# Patient Record
Sex: MALE | Race: WHITE
[De-identification: names, ages, dates, MRNs, and addresses within clinical notes are randomized per-mention and may not be internally consistent; named-entity substitution may affect disease eponyms.]

---

## 2017-05-12 ENCOUNTER — HOSPITAL ENCOUNTER (OUTPATIENT)
Dept: HOSPITAL 80 - FCP | Age: 57
End: 2017-05-12
Attending: GENERAL ACUTE CARE HOSPITAL
Payer: COMMERCIAL

## 2017-05-12 DIAGNOSIS — Z01.818: Primary | ICD-10-CM

## 2017-05-12 NOTE — CPEKG
Heart Rate: 77

RR Interval: 779

P-R Interval: 180

QRSD Interval: 100

QT Interval: 360

QTC Interval: 408

P Axis: 50

QRS Axis: -45

T Wave Axis: 68

EKG Severity - ABNORMAL ECG -

EKG Impression: SINUS RHYTHM

EKG Impression: VENTRICULAR PREMATURE COMPLEX

EKG Impression: LEFT ANTERIOR FASCICULAR BLOCK

Electronically Signed By: Sam Villalobos 12-May-2017 16:26:41

## 2017-09-29 ENCOUNTER — HOSPITAL ENCOUNTER (EMERGENCY)
Dept: HOSPITAL 80 - FED | Age: 57
Discharge: HOME | End: 2017-09-29
Payer: COMMERCIAL

## 2017-09-29 VITALS
OXYGEN SATURATION: 97 % | TEMPERATURE: 97.7 F | SYSTOLIC BLOOD PRESSURE: 133 MMHG | DIASTOLIC BLOOD PRESSURE: 70 MMHG | HEART RATE: 80 BPM | RESPIRATION RATE: 14 BRPM

## 2017-09-29 DIAGNOSIS — K52.9: Primary | ICD-10-CM

## 2017-09-29 DIAGNOSIS — Z79.82: ICD-10-CM

## 2017-09-29 DIAGNOSIS — E86.9: ICD-10-CM

## 2017-09-29 LAB
% IMMATURE GRANULYOCYTES: 0.3 % (ref 0–1.1)
ABSOLUTE IMMATURE GRANULOCYTES: 0.02 10^3/UL (ref 0–0.1)
ABSOLUTE NRBC COUNT: 0 10^3/UL (ref 0–0.01)
ADD DIFF?: NO
ADD MORPH?: NO
ADD SCAN?: NO
ALBUMIN SERPL-MCNC: 4.5 G/DL (ref 3.5–5)
ALP SERPL-CCNC: 83 IU/L (ref 38–126)
ALT SERPL-CCNC: 37 IU/L (ref 21–72)
ANION GAP SERPL CALC-SCNC: 13 MEQ/L (ref 8–16)
AST SERPL-CCNC: 28 IU/L (ref 17–59)
ATYPICAL LYMPHOCYTE FLAG: 0 (ref 0–99)
BILIRUB SERPL-MCNC: 0.7 MG/DL (ref 0.1–1.4)
BILIRUBIN-CONJUGATED: 0.3 MG/DL (ref 0–0.5)
BILIRUBIN-UNCONJUGATED: 0.4 MG/DL (ref 0–1.1)
CALCIUM SERPL-MCNC: 10.1 MG/DL (ref 8.5–10.4)
CHLORIDE SERPL-SCNC: 101 MEQ/L (ref 97–110)
CO2 SERPL-SCNC: 26 MEQ/L (ref 22–31)
CREAT SERPL-MCNC: 0.6 MG/DL (ref 0.7–1.3)
ERYTHROCYTE [DISTWIDTH] IN BLOOD BY AUTOMATED COUNT: 16.2 % (ref 11.5–15.2)
FRAGMENT RBC FLAG: 0 (ref 0–99)
GFR SERPL CREATININE-BSD FRML MDRD: > 60 ML/MIN/{1.73_M2}
GLUCOSE SERPL-MCNC: 105 MG/DL (ref 70–100)
HCT VFR BLD CALC: 39.9 % (ref 40–51)
HGB BLD-MCNC: 12.8 G/DL (ref 13.7–17.5)
LEFT SHIFT FLG: 0 (ref 0–99)
LIPEMIA HEMOLYSIS FLAG: 80 (ref 0–99)
MCH RBC BLDCO QN: 26.9 PG (ref 27.9–34.1)
MCHC RBC AUTO-ENTMCNC: 32.1 G/DL (ref 32.4–36.7)
MCV RBC AUTO: 84 FL (ref 81.5–99.8)
NRBC-AUTO%: 0 % (ref 0–0.2)
PLATELET # BLD: 320 10^3/UL (ref 150–400)
PLATELET CLUMPS FLAG: 0 (ref 0–99)
PMV BLD AUTO: 9.7 FL (ref 8.7–11.7)
POTASSIUM SERPL-SCNC: 3.6 MEQ/L (ref 3.5–5.2)
PROT SERPL-MCNC: 7.4 G/DL (ref 6.3–8.2)
RBC # BLD AUTO: 4.75 10^6/UL (ref 4.4–6.38)
SODIUM SERPL-SCNC: 140 MEQ/L (ref 134–144)
TROPONIN I SERPL-MCNC: < 0.012 NG/ML (ref 0–0.03)

## 2017-09-29 NOTE — EDPHY
H & P


Stated Complaint: Generalized abdominal pain


Time Seen by Provider: 09/29/17 12:45


HPI/ROS: 





CHIEF COMPLAINT: abdominal pain, vomiting





HISTORY OF PRESENT ILLNESS:  56-year-old male presents emergency department 

with his wife for abdominal pain, nausea and vomiting.  Patient was diagnosed 

with celiac disease 10 months ago, he ate at a restaurant last night thinks 

perhaps the rice noodles had wheat in them.  He also ate shrimp.  He got home 

and started feeling "unsettled" in the abdomen.  He woke up this morning with 

the same "unsettled" feeling, went to work, ate oatmeal and vomiting 15 minutes 

after eating breakfast.  Pt vomited 4 more times at home prior to arrival here 

today.  2 bowel movements this morning, "maybe a little softer that normal".  

He feels chills, intermittent lower abdominal sharp pain with cramping.  No 

urinary symptoms, no back pain.  No blood in emesis. 





REVIEW OF SYSTEMS:


A comprehensive 10 point review of systems is otherwise negative aside from 

elements mentioned in the history of present illness.


Source: Patient


Exam Limitations: No limitations





- Personal History


Current Tetanus/Diphtheria Vaccine: Yes


Current Tetanus Diphtheria and Acellular Pertussis (TDAP): Yes





- Medical/Surgical History


Hx Asthma: No


Hx Chronic Respiratory Disease: No


Hx Diabetes: No


Hx Cardiac Disease: Yes


Hx Renal Disease: No


Hx Cirrhosis: No


Hx Alcoholism: No


Hx HIV/AIDS: No


Hx Splenectomy or Spleen Trauma: No


Other PMH: hyperlipidemia, gerd, celiac





- Social History


Smoking Status: Never smoked


Constitutional: 


 Initial Vital Signs











Temperature (C)  36.7 C   09/29/17 12:12


 


Heart Rate  73   09/29/17 12:12


 


Respiratory Rate  16   09/29/17 12:12


 


Blood Pressure  141/97 H  09/29/17 12:12


 


O2 Sat (%)  98   09/29/17 12:12








 











O2 Delivery Mode               Room Air














Allergies/Adverse Reactions: 


 





No Known Allergies Allergy (Unverified 03/06/11 19:13)


 








Home Medications: 














 Medication  Instructions  Recorded


 


Aspirin  03/22/16


 


Crestor  03/22/16


 


Pantoprazole Sodium  03/22/16


 


Ranitidine HCl  03/22/16


 


Ondansetron Odt [Zofran Odt] 4 mg PO Q6-8PRN PRN #8 tab 09/29/17














Medical Decision Making





- Diagnostics


EKG Interpretation: 





EKG shows normal sinus rhythm, rate 59, left axis deviation, good R-wave 

progression, no ST or T-wave abnormalities.


Imaging Results: 


 Imaging Impressions





Abdomen CT  09/29/17 13:03


Impression:


1. Mild distention of the stomach and proximal to mid duodenum with very mild 

thickening of the third and fourth portions of the duodenum. Consider mild 

duodenitis.


2. No CT evidence of appendicitis, abscess or bowel obstruction. 


 


Findings discussed with Olga Ambrose NP  at  14:00 hour, 9/29/2017.


 


 


 











Imaging: Discussed imaging studies w/ On call Radiologist


ED Course/Re-evaluation: 





IV established, CBC, chemistry panel, LFTs, lipase, troponin, EKG and CT 

abdomen pelvis with IV contrast ordered.  Urinalysis ordered.





The CBC, Chem panel, LFTs and lipase are all unremarkable, troponin is 

negative.  EKG is unremarkable. Urinalysis with no sign of infection.





Report called by Dr. Pringle as normal CT abdomen pelvis with no abnormality.  





Pt is given 1 liter of NS, zofran, protonix and simethicone.  He is feeling 

better and is comfortable with plans for discharge home.  He is given return 

precautions.  


Differential Diagnosis: 





The differential diagnosis for the patient's nausea and vomiting included but 

was not limited to gastroenteritis, gastritis, appendicitis, small-bowel 

obstruction, diverticulitis.





- Data Points


Laboratory Results: 


 Laboratory Results





 09/29/17 12:36 





 09/29/17 12:36 





 











  09/29/17 09/29/17





  12:36 12:36


 


WBC    6.88 10^3/uL 10^3/uL





    (3.80-9.50) 


 


RBC    4.75 10^6/uL 10^6/uL





    (4.40-6.38) 


 


Hgb    12.8 g/dL L g/dL





    (13.7-17.5) 


 


Hct    39.9 % L %





    (40.0-51.0) 


 


MCV    84.0 fL fL





    (81.5-99.8) 


 


MCH    26.9 pg L pg





    (27.9-34.1) 


 


MCHC    32.1 g/dL L g/dL





    (32.4-36.7) 


 


RDW    16.2 % H %





    (11.5-15.2) 


 


Plt Count    320 10^3/uL 10^3/uL





    (150-400) 


 


MPV    9.7 fL fL





    (8.7-11.7) 


 


Neut % (Auto)    82.6 % H %





    (39.3-74.2) 


 


Lymph % (Auto)    11.8 % L %





    (15.0-45.0) 


 


Mono % (Auto)    4.4 % L %





    (4.5-13.0) 


 


Eos % (Auto)    0.3 % L %





    (0.6-7.6) 


 


Baso % (Auto)    0.6 % %





    (0.3-1.7) 


 


Nucleat RBC Rel Count    0.0 % %





    (0.0-0.2) 


 


Absolute Neuts (auto)    5.69 10^3/uL 10^3/uL





    (1.70-6.50) 


 


Absolute Lymphs (auto)    0.81 10^3/uL L 10^3/uL





    (1.00-3.00) 


 


Absolute Monos (auto)    0.30 10^3/uL 10^3/uL





    (0.30-0.80) 


 


Absolute Eos (auto)    0.02 10^3/uL L 10^3/uL





    (0.03-0.40) 


 


Absolute Basos (auto)    0.04 10^3/uL 10^3/uL





    (0.02-0.10) 


 


Absolute Nucleated RBC    0.00 10^3/uL 10^3/uL





    (0-0.01) 


 


Immature Gran %    0.3 % %





    (0.0-1.1) 


 


Immature Gran #    0.02 10^3/uL 10^3/uL





    (0.00-0.10) 


 


Sodium  140 mEq/L mEq/L  





   (134-144)  


 


Potassium  3.6 mEq/L mEq/L  





   (3.5-5.2)  


 


Chloride  101 mEq/L mEq/L  





   ()  


 


Carbon Dioxide  26 mEq/l mEq/l  





   (22-31)  


 


Anion Gap  13 mEq/L mEq/L  





   (8-16)  


 


BUN  13 mg/dL mg/dL  





   (7-23)  


 


Creatinine  0.6 mg/dL L mg/dL  





   (0.7-1.3)  


 


Estimated GFR  > 60   





   


 


Glucose  105 mg/dL H mg/dL  





   ()  


 


Calcium  10.1 mg/dL mg/dL  





   (8.5-10.4)  


 


Total Bilirubin  0.7 mg/dL mg/dL  





   (0.1-1.4)  


 


Conjugated Bilirubin  0.3 mg/dL mg/dL  





   (0.0-0.5)  


 


Unconjugated Bilirubin  0.4 mg/dL mg/dL  





   (0.0-1.1)  


 


AST  28 IU/L IU/L  





   (17-59)  


 


ALT  37 IU/L IU/L  





   (21-72)  


 


Alkaline Phosphatase  83 IU/L IU/L  





   ()  


 


Troponin I  < 0.012 ng/mL ng/mL  





   (0.000-0.034)  


 


Total Protein  7.4 g/dL g/dL  





   (6.3-8.2)  


 


Albumin  4.5 g/dL g/dL  





   (3.5-5.0)  


 


Lipase  151 IU/L IU/L  





   ()  











Medications Given: 


 








Discontinued Medications





Sodium Chloride (Ns)  1,000 mls @ 0 mls/hr IV EDNOW ONE; Wide Open


   PRN Reason: Protocol


   Stop: 09/29/17 14:17


   Last Admin: 09/29/17 14:50 Dose:  1,000 mls


Pantoprazole Sodium 40 mg/ (Sodium Chloride)  100 mls @ 200 mls/hr IV EDNOW ONE


   Stop: 09/29/17 16:23


   Last Admin: 09/29/17 16:02 Dose:  100 mls


Ondansetron HCl (Zofran)  4 mg IVP EDNOW ONE


   Stop: 09/29/17 14:59


   Last Admin: 09/29/17 15:06 Dose:  4 mg


Simethicone (Mylicon)  160 mg PO EDNOW ONE


   Stop: 09/29/17 15:00


   Last Admin: 09/29/17 15:07 Dose:  160 mg








Departure





- Departure


Disposition: Home, Routine, Self-Care


Clinical Impression: 


 Acute gastroenteritis





Condition: Good


Instructions:  Gastroenteritis (ED), Acute Nausea and Vomiting (ED)


Additional Instructions: 


Drink clear liquids for the next 24 hours then advance your diet slowly as 

tolerated with bland foods such as rice, bananas, applesauce.  Take zofran as 

needed for nausea every 6-8 hours.  Follow up with Dr. Dunn at first available 

appointment.  Return to the emergency department for any new symptoms, 

worsening symptoms or concerns.    


Referrals: 


Jemma Cantu MD [Primary Care Provider] - As per Instructions


Prescriptions: 


Ondansetron Odt [Zofran Odt] 4 mg PO Q6-8PRN PRN #8 tab


 PRN Reason: Nausea/Vomiting, Can'T Take Po

## 2017-09-29 NOTE — CPEKG
Heart Rate: 59

RR Interval: 1017

P-R Interval: 184

QRSD Interval: 100

QT Interval: 408

QTC Interval: 405

P Axis: 15

QRS Axis: -48

T Wave Axis: 32

EKG Severity - ABNORMAL ECG -

EKG Impression: SINUS RHYTHM

EKG Impression: LEFT ANTERIOR FASCICULAR BLOCK

Electronically Signed By: Aliyah Pierre 30-Sep-2017 21:09:10

## 2019-01-31 ENCOUNTER — HOSPITAL ENCOUNTER (OUTPATIENT)
Dept: HOSPITAL 80 - FIMAGING | Age: 59
End: 2019-01-31
Attending: INTERNAL MEDICINE
Payer: COMMERCIAL

## 2019-01-31 DIAGNOSIS — D35.2: Primary | ICD-10-CM

## 2019-01-31 PROCEDURE — A9585 GADOBUTROL INJECTION: HCPCS
